# Patient Record
Sex: MALE | Race: WHITE | NOT HISPANIC OR LATINO | Employment: FULL TIME | URBAN - METROPOLITAN AREA
[De-identification: names, ages, dates, MRNs, and addresses within clinical notes are randomized per-mention and may not be internally consistent; named-entity substitution may affect disease eponyms.]

---

## 2017-01-16 ENCOUNTER — OFFICE VISIT (OUTPATIENT)
Dept: MEDICAL GROUP | Facility: PHYSICIAN GROUP | Age: 47
End: 2017-01-16
Payer: COMMERCIAL

## 2017-01-16 VITALS
BODY MASS INDEX: 23.99 KG/M2 | HEIGHT: 76 IN | SYSTOLIC BLOOD PRESSURE: 124 MMHG | HEART RATE: 78 BPM | DIASTOLIC BLOOD PRESSURE: 74 MMHG | TEMPERATURE: 98.9 F | OXYGEN SATURATION: 96 % | RESPIRATION RATE: 16 BRPM | WEIGHT: 197 LBS

## 2017-01-16 DIAGNOSIS — Z13.1 SCREENING FOR DIABETES MELLITUS (DM): ICD-10-CM

## 2017-01-16 DIAGNOSIS — Z00.00 ADULT GENERAL MEDICAL EXAM: ICD-10-CM

## 2017-01-16 DIAGNOSIS — D17.1 LIPOMA OF TORSO: ICD-10-CM

## 2017-01-16 DIAGNOSIS — F17.219 CIGARETTE NICOTINE DEPENDENCE WITH NICOTINE-INDUCED DISORDER: ICD-10-CM

## 2017-01-16 DIAGNOSIS — Z13.6 SCREENING FOR CARDIOVASCULAR CONDITION: ICD-10-CM

## 2017-01-16 PROBLEM — F17.210 DEPENDENCE ON NICOTINE FROM CIGARETTES: Status: ACTIVE | Noted: 2017-01-16

## 2017-01-16 PROCEDURE — 99204 OFFICE O/P NEW MOD 45 MIN: CPT | Performed by: INTERNAL MEDICINE

## 2017-01-16 ASSESSMENT — PATIENT HEALTH QUESTIONNAIRE - PHQ9: CLINICAL INTERPRETATION OF PHQ2 SCORE: 0

## 2017-01-16 NOTE — PROGRESS NOTES
No chief complaint on file.        HISTORY OF THE PRESENT ILLNESS: Patient is a 46 y.o. male. This pleasant patient is here today for status with care, nicotine dependence, torso mass    Health Maintenance: Completed      Adult general medical exam  Pt is here to establish care. He has no chronic medical conditions and is not on any medication.     Dependence on nicotine from cigarettes  Pt has been smoking for about 29 years at 1/2 PPD. He has tried nicotine replacement in the past with no success. He wants to try and quit now and wants to try chantix. He gets occasional SOB with activity that has been worse in the past couple months. It is not worse with cold weather or at night. Denies chest pain. He has mild wheezing with breathing.     Lipoma of torso  Pt has had recurring masses that have been present for years. They do not progressively increase in size and do not cause pain. He has not had unintentional weight loss. He states he gets other lesions on his extremities that resolve spontaneously. His mother has similar issues. Denies unintentional weight loss.       Allergies: Ambien    Current Outpatient Prescriptions Ordered in [facility]   Medication Sig Dispense Refill   • varenicline (CHANTIX STARTING MONTH APRIL) 0.5 MG X 11 & 1 MG X 42 tablet Take as prescribed 56 Tab 3     No current Epic-ordered facility-administered medications on file.       History reviewed. No pertinent past medical history.    History reviewed. No pertinent past surgical history.    Social History   Substance Use Topics   • Smoking status: Current Every Day Smoker -- 0.50 packs/day for 29 years     Types: Cigarettes   • Smokeless tobacco: Never Used      Comment: 1 ppd   • Alcohol Use: No       Family History   Problem Relation Age of Onset   • Cancer Mother      breast   • Heart Disease Maternal Grandmother    • Heart Disease Maternal Grandfather        Review of Systems :    Denies chest pain, positive wheezing  All other systems  "reviewed and are negative except as in HPI.      Exam: Blood pressure 124/74, pulse 78, temperature 37.2 °C (98.9 °F), resp. rate 16, height 1.93 m (6' 4\"), weight 89.359 kg (197 lb), SpO2 96 %.    Blood pressure 124/74, pulse 78, temperature 37.2 °C (98.9 °F), resp. rate 16, height 1.93 m (6' 4\"), weight 89.359 kg (197 lb), SpO2 96 %. Body mass index is 23.99 kg/(m^2).   Physical Exam:  Constitutional: Alert & oriented, no acute distress  Eye: Equal, round and reactive, conjunctiva clear, lids normal  ENMT: Lips without lesions, good dentition, oropharynx clear  Neck: Trachea midline, no masses, no thyromegaly. No cervical or supraclavicular lymphadenopathy  Respiratory: Unlabored respiratory effort, lungs clear to auscultation, no wheezes, no ronchi  Cardiovascular: Normal S1, S2, no murmur, no edema  Abdomen: Left back and left abdomen with areas of soft tissue masses that are freely mobile. Mass on back is approximately 3 cm in diameter and mass on abdomen is approximately 4-5 cm in diameter. No tenderness to palpation, redness, warmth associated.  Skin: Warm, dry, good turgor, no rashes in visible areas  Neuro: No overt focal neurologic deficits, normal gait  Psych: Normal mood and affect, judgement normal  Musculoskeletal: Normal gait. No extremity cyanosis, clubbing, or edema.      Assessment/Plan  1. Adult general medical exam  Patient establish care today. He deferred influenza vaccine and states he believes he is up-to-date on tetanus booster vaccine. He is also technically due for pneumonia vaccine but he is able to will no longer required.    2. Cigarette nicotine dependence with nicotine-induced disorder  Patient has failed nicotine replacement in the past. We'll start Chantix, patient counseled about medication requirements  - varenicline (CHANTIX STARTING MONTH PAK) 0.5 MG X 11 & 1 MG X 42 tablet; Take as prescribed  Dispense: 56 Tab; Refill: 3  - CBC WITH DIFFERENTIAL; Future    3. Screening for " diabetes mellitus (DM)  COMP METABOLIC PANEL; Future    4. Screening for cardiovascular condition  - LIPID PROFILE; Future    5. Lipoma of torso  Etiology of mass most likely from lipoma. We'll get ultrasound to verify this  - US-ABDOMEN LIMITED; Future      Return in about 1 year (around 1/16/2018).    Please note that this dictation was created using voice recognition software. I have made every reasonable attempt to correct obvious errors, but I expect that there are errors of grammar and possibly content that I did not discover before finalizing the note.

## 2017-01-16 NOTE — PATIENT INSTRUCTIONS
Lipoma  A lipoma is a noncancerous (benign) tumor that is made up of fat cells. This is a very common type of soft-tissue growth. Lipomas are usually found under the skin (subcutaneous). They may occur in any tissue of the body that contains fat. Common areas for lipomas to appear include the back, shoulders, buttocks, and thighs. Lipomas grow slowly, and they are usually painless. Most lipomas do not cause problems and do not require treatment.  CAUSES  The cause of this condition is not known.  RISK FACTORS  This condition is more likely to develop in:  · People who are 40-60 years old.  · People who have a family history of lipomas.  SYMPTOMS  A lipoma usually appears as a small, round bump under the skin. It may feel soft or rubbery, but the firmness can vary. Most lipomas are not painful. However, a lipoma may become painful if it is located in an area where it pushes on nerves.  DIAGNOSIS  A lipoma can usually be diagnosed with a physical exam. You may also have tests to confirm the diagnosis and to rule out other conditions. Tests may include:  · Imaging tests, such as a CT scan or MRI.  · Removal of a tissue sample to be looked at under a microscope (biopsy).  TREATMENT  Treatment is not needed for small lipomas that are not causing problems. If a lipoma continues to get bigger or it causes problems, removal is often the best option. Lipomas can also be removed to improve appearance. Removal of a lipoma is usually done with a surgery in which the fatty cells and the surrounding capsule are removed. Most often, a medicine that numbs the area (local anesthetic) is used for this procedure.  HOME CARE INSTRUCTIONS  · Keep all follow-up visits as directed by your health care provider. This is important.  SEEK MEDICAL CARE IF:  · Your lipoma becomes larger or hard.  · Your lipoma becomes painful, red, or increasingly swollen. These could be signs of infection or a more serious condition.     This information is  not intended to replace advice given to you by your health care provider. Make sure you discuss any questions you have with your health care provider.     Document Released: 12/08/2003 Document Revised: 05/03/2016 Document Reviewed: 12/14/2015  [employer] Interactive Patient Education ©2016 [employer]

## 2017-01-16 NOTE — ASSESSMENT & PLAN NOTE
Pt has had recurring masses that have been present for years. They do not progressively increase in size and do not cause pain. He has not had unintentional weight loss. He states he gets other lesions on his extremities that resolve spontaneously. His mother has similar issues. Denies unintentional weight loss.

## 2017-01-16 NOTE — ASSESSMENT & PLAN NOTE
Pt has been smoking for about 29 years at 1/2 PPD. He has tried nicotine replacement in the past with no success. He wants to try and quit now and wants to try chantix. He gets occasional SOB with activity that has been worse in the past couple months. It is not worse with cold weather or at night. Denies chest pain. He has mild wheezing with breathing.

## 2017-11-01 ENCOUNTER — OFFICE VISIT (OUTPATIENT)
Dept: URGENT CARE | Facility: PHYSICIAN GROUP | Age: 47
End: 2017-11-01
Payer: COMMERCIAL

## 2017-11-01 VITALS
HEIGHT: 76 IN | HEART RATE: 70 BPM | SYSTOLIC BLOOD PRESSURE: 116 MMHG | BODY MASS INDEX: 23.38 KG/M2 | TEMPERATURE: 98.3 F | DIASTOLIC BLOOD PRESSURE: 78 MMHG | OXYGEN SATURATION: 98 % | RESPIRATION RATE: 18 BRPM | WEIGHT: 192 LBS

## 2017-11-01 DIAGNOSIS — S16.1XXA ACUTE CERVICAL MYOFASCIAL STRAIN, INITIAL ENCOUNTER: ICD-10-CM

## 2017-11-01 PROCEDURE — 99214 OFFICE O/P EST MOD 30 MIN: CPT | Performed by: PHYSICIAN ASSISTANT

## 2017-11-01 RX ORDER — PREDNISONE 20 MG/1
20 TABLET ORAL DAILY
Qty: 10 TAB | Refills: 0 | Status: SHIPPED | OUTPATIENT
Start: 2017-11-01

## 2017-11-01 RX ORDER — CYCLOBENZAPRINE HCL 10 MG
10 TABLET ORAL 3 TIMES DAILY PRN
Qty: 30 TAB | Refills: 0 | Status: SHIPPED | OUTPATIENT
Start: 2017-11-01

## 2017-11-01 NOTE — LETTER
November 1, 2017         Patient: Ruperto Santizo   YOB: 1970   Date of Visit: 11/1/2017           To Whom it May Concern:    Ruperto Santizo was seen in my clinic on 11/1/2017. He may return to work on 11/03/2017 of sooner if condition improves sooner.     If you have any questions or concerns, please don't hesitate to call.        Sincerely,           Jayne Boyle P.A.-C.  Electronically Signed

## 2017-11-06 ASSESSMENT — ENCOUNTER SYMPTOMS
MYALGIAS: 1
FEVER: 0
TINGLING: 0
NECK PAIN: 1
TROUBLE SWALLOWING: 0
SHORTNESS OF BREATH: 0
WEAKNESS: 0
SENSORY CHANGE: 0
FOCAL WEAKNESS: 0
PALPITATIONS: 0
PARESIS: 0

## 2017-11-06 NOTE — PROGRESS NOTES
"Subjective:      Ruperto Santizo is a 47 y.o. male who presents with Neck Pain (radiating to right shoulder x3 days)    Pt PMH, SocHx, SurgHx, FamHx, Drug allergies and medications reviewed with pt/[facility].      Family history reviewed, it is not pertinent to this complaint.           PT co right sided neck pain, muscle spasm to right shoulder blade.  PT states no recent injury but woke up with a sore neck a few days ago, has tried OTC nsaids, hot showers, heating pad with short term relief, but no resolution . PT states he has had this before and muscle relaxants and steroid taper works very well.  PT knows he has some cervical DDD and this flares up every now and then.  NO numbness tingling to extremities, no weakness.        Neck Pain    This is a new problem. The current episode started in the past 7 days. The problem occurs constantly. The problem has been unchanged. The pain is associated with an unknown factor. The pain is present in the right side. The quality of the pain is described as cramping, burning and aching. The pain is at a severity of 5/10. The symptoms are aggravated by twisting, position and bending. The pain is same all the time. Pertinent negatives include no chest pain, fever, paresis, tingling, trouble swallowing or weakness. He has tried heat, home exercises and NSAIDs for the symptoms. The treatment provided mild relief.       Review of Systems   Constitutional: Negative for fever.   HENT: Negative for trouble swallowing.    Respiratory: Negative for shortness of breath.    Cardiovascular: Negative for chest pain, palpitations and leg swelling.   Musculoskeletal: Positive for myalgias and neck pain.   Neurological: Negative for tingling, sensory change, focal weakness and weakness.   All other systems reviewed and are negative.         Objective:     /78   Pulse 70   Temp 36.8 °C (98.3 °F)   Resp 18   Ht 1.93 m (6' 4\")   Wt 87.1 kg (192 lb)   SpO2 98%   BMI 23.37 kg/m²  "     Physical Exam   Constitutional: He is oriented to person, place, and time. He appears well-developed and well-nourished. No distress.   HENT:   Head: Normocephalic and atraumatic.   Nose: Nose normal.   Eyes: Conjunctivae and EOM are normal. Pupils are equal, round, and reactive to light.   Neck: Normal range of motion. Neck supple.   Cardiovascular: Normal rate, regular rhythm and normal heart sounds.    Pulmonary/Chest: Effort normal and breath sounds normal.   Abdominal: Soft.   Musculoskeletal: Normal range of motion.        Cervical back: He exhibits tenderness, pain and spasm. He exhibits normal range of motion, no bony tenderness and normal pulse.        Back:    Neurological: He is alert and oriented to person, place, and time. Gait normal.   Skin: Skin is warm and dry. Capillary refill takes less than 2 seconds.   Psychiatric: He has a normal mood and affect.   Nursing note and vitals reviewed.         Assessment/Plan:     1. Acute cervical myofascial strain, initial encounter  cyclobenzaprine (FLEXERIL) 10 MG Tab    predniSONE (DELTASONE) 20 MG Tab       PT instructed not to drive or operate heavy machinery while taking this medication as it causes drowsiness.  PT also instructed not to drink alcohol while taking this medication because it contains benzodiazepines. PT verbalized understanding of these instructions.      PT should follow up with PCP in 1-2 days for re-evaluation if symptoms have not improved.  Discussed red flags and reasons to return to UC or ED.  Pt and/or family verbalized understanding of diagnosis and follow up instructions and declined informational handout on diagnosis.  PT discharged.